# Patient Record
Sex: FEMALE | Race: WHITE | NOT HISPANIC OR LATINO
[De-identification: names, ages, dates, MRNs, and addresses within clinical notes are randomized per-mention and may not be internally consistent; named-entity substitution may affect disease eponyms.]

---

## 2018-10-29 ENCOUNTER — RESULT REVIEW (OUTPATIENT)
Age: 73
End: 2018-10-29

## 2022-05-12 ENCOUNTER — APPOINTMENT (OUTPATIENT)
Dept: ORTHOPEDIC SURGERY | Facility: CLINIC | Age: 77
End: 2022-05-12
Payer: MEDICARE

## 2022-05-12 VITALS — HEIGHT: 60 IN | WEIGHT: 145 LBS | BODY MASS INDEX: 28.47 KG/M2

## 2022-05-12 DIAGNOSIS — Z78.9 OTHER SPECIFIED HEALTH STATUS: ICD-10-CM

## 2022-05-12 PROCEDURE — 20610 DRAIN/INJ JOINT/BURSA W/O US: CPT

## 2022-05-12 PROCEDURE — 99213 OFFICE O/P EST LOW 20 MIN: CPT | Mod: 25

## 2022-05-12 RX ORDER — METHYLPHENIDATE HYDROCHLORIDE 5 MG/1
TABLET ORAL
Refills: 0 | Status: ACTIVE | COMMUNITY

## 2022-05-12 RX ORDER — HYDROXYZINE HYDROCHLORIDE 50 MG/1
TABLET ORAL
Refills: 0 | Status: ACTIVE | COMMUNITY

## 2022-05-12 RX ORDER — DOXEPIN 6 MG/1
TABLET, FILM COATED ORAL
Refills: 0 | Status: ACTIVE | COMMUNITY

## 2022-05-12 RX ORDER — ATORVASTATIN CALCIUM 80 MG/1
TABLET, FILM COATED ORAL
Refills: 0 | Status: ACTIVE | COMMUNITY

## 2022-05-12 RX ORDER — VILAZODONE HYDROCHLORIDE 40 MG/1
TABLET ORAL
Refills: 0 | Status: ACTIVE | COMMUNITY

## 2022-05-12 RX ORDER — PROPRANOLOL HYDROCHLORIDE 80 MG/1
TABLET ORAL
Refills: 0 | Status: ACTIVE | COMMUNITY

## 2022-05-12 RX ORDER — MIRTAZAPINE 7.5 MG/1
TABLET, FILM COATED ORAL
Refills: 0 | Status: ACTIVE | COMMUNITY

## 2022-05-12 RX ORDER — CLONAZEPAM 2 MG/1
TABLET ORAL
Refills: 0 | Status: ACTIVE | COMMUNITY

## 2022-05-12 RX ORDER — ZOLPIDEM TARTRATE 5 MG/1
TABLET ORAL
Refills: 0 | Status: ACTIVE | COMMUNITY

## 2022-05-12 NOTE — HISTORY OF PRESENT ILLNESS
[8] : 8 [4] : 4 [FreeTextEntry5] : PT IS HERE FOR A FOLLOW UP ON THE R KNEE. cortisone inj helped for a few days, now knee pain has worsened

## 2022-05-12 NOTE — PROCEDURE
[FreeTextEntry3] : Large joint injection  was performed of the RIGHT  knee. \par The indication for this procedure was pain, inflammation and x-ray evidence of Osteoarthritis on this or prior visit. The site was prepped with alcohol, betadine, ethyl chloride sprayed topically and sterile technique used. \par An injection of ORTHOVISC  series; # 1  was used.  \par Patient was advised to call if redness, pain or fever occur, apply ice for 15 minutes out of every hour for the next 12-24 hours as tolerated and patient was advised to rest the joint(s) for 2 days. Patient has tried OTC's including aspirin, Ibuprofen, Aleve, etc or prescription NSAIDS, and/or exercises at home and/or physical therapy without satisfactory response, patient had decreased mobility in the joint and the risks benefits, and alternatives have been discussed, and verbal consent was obtained. \par  \par

## 2022-05-12 NOTE — IMAGING
[de-identified] : Right knee Varus deformity\par Mild effusion, no warmth, no ecchymosis\par Medial joint line tenderness to palpation \par Range of motion 5-110 with associated crepitus\par 5/5 quadriceps and hamstring strength\par Ligamentously stable\par Motor and sensory intact distally\par Mildly antalgic gait\par Negative Eliel\par

## 2022-05-19 ENCOUNTER — APPOINTMENT (OUTPATIENT)
Dept: ORTHOPEDIC SURGERY | Facility: CLINIC | Age: 77
End: 2022-05-19
Payer: MEDICARE

## 2022-05-19 VITALS — WEIGHT: 145 LBS | BODY MASS INDEX: 28.47 KG/M2 | HEIGHT: 60 IN

## 2022-05-19 PROCEDURE — 99024 POSTOP FOLLOW-UP VISIT: CPT

## 2022-05-19 PROCEDURE — 20610 DRAIN/INJ JOINT/BURSA W/O US: CPT

## 2022-05-19 NOTE — PROCEDURE
[FreeTextEntry3] : Large joint injection  was performed of the RIGHT knee. \par The indication for this procedure was pain, inflammation and x-ray evidence of Osteoarthritis on this or prior visit. The site was prepped with alcohol, betadine, ethyl chloride sprayed topically and sterile technique used. \par An injection of ORTHOVISC  series; #2 was used.  \par Patient was advised to call if redness, pain or fever occur, apply ice for 15 minutes out of every hour for the next 12-24 hours as tolerated and patient was advised to rest the joint(s) for 2 days. Patient has tried OTC's including aspirin, Ibuprofen, Aleve, etc or prescription NSAIDS, and/or exercises at home and/or physical therapy without satisfactory response, patient had decreased mobility in the joint and the risks benefits, and alternatives have been discussed, and verbal consent was obtained. \par 17CC CLEAR SF ASPIRATED PRIOR\par

## 2022-05-24 ENCOUNTER — TRANSCRIPTION ENCOUNTER (OUTPATIENT)
Age: 77
End: 2022-05-24

## 2022-05-25 ENCOUNTER — APPOINTMENT (OUTPATIENT)
Dept: ORTHOPEDIC SURGERY | Facility: CLINIC | Age: 77
End: 2022-05-25

## 2022-05-26 ENCOUNTER — APPOINTMENT (OUTPATIENT)
Dept: ORTHOPEDIC SURGERY | Facility: CLINIC | Age: 77
End: 2022-05-26

## 2022-05-27 ENCOUNTER — APPOINTMENT (OUTPATIENT)
Dept: ORTHOPEDIC SURGERY | Facility: CLINIC | Age: 77
End: 2022-05-27

## 2022-05-27 VITALS — HEIGHT: 60 IN | BODY MASS INDEX: 28.47 KG/M2 | WEIGHT: 145 LBS

## 2022-05-27 PROCEDURE — 20610 DRAIN/INJ JOINT/BURSA W/O US: CPT

## 2022-05-31 NOTE — HISTORY OF PRESENT ILLNESS
[3] : 3 [Orthovisc] : Orthovisc [de-identified] : ORTHOVISC #3 RIGHT KNEE [] : no [de-identified] : 05/19/2022 [de-identified] : right knee

## 2022-05-31 NOTE — PROCEDURE
[de-identified] : Procedure Name: Orthovisc (Large Joint)\par \par Viscosupplementation Injection: X-ray evidence of Osteoarthritis on this or prior visit, Patient has tried OTC's including aspirin, Ibuprofen, Aleve etc or prescription NSAIDS, and/or exercises at home and/ or physical therapy without satisfactory response and Repeat series performed because patient had significant improvement in their pain and functional capacity from prior series which was given more than six months ago. \par \par An injection of Orthovisc 2ml #3 was injected into the right knee(s). The risks, benefits, and alternatives to Viscosupplementation injection were explained in full to the patient. Risks outlined include but are not limited to infection, sepsis, bleeding, scarring, skin discoloration, temporary increase in pain, syncopal episode, failure to resolve symptoms, allergic reaction, and symptom recurrence. Signs and symptoms of infection reviewed and patient advised to call immediately for redness, fevers, and/or chills. Patient understood the risks. All questions were answered. After discussion of options, patient requested Viscosupplementation. Oral informed consent was obtained and sterile prep was done of the injection site. The patient tolerated the procedure well. Ice tonight to the injection site. \par

## 2022-06-02 ENCOUNTER — APPOINTMENT (OUTPATIENT)
Dept: ORTHOPEDIC SURGERY | Facility: CLINIC | Age: 77
End: 2022-06-02

## 2022-06-08 ENCOUNTER — APPOINTMENT (OUTPATIENT)
Dept: ORTHOPEDIC SURGERY | Facility: CLINIC | Age: 77
End: 2022-06-08
Payer: MEDICARE

## 2022-06-08 VITALS — HEIGHT: 60 IN | WEIGHT: 145 LBS | BODY MASS INDEX: 28.47 KG/M2

## 2022-06-08 DIAGNOSIS — M17.11 UNILATERAL PRIMARY OSTEOARTHRITIS, RIGHT KNEE: ICD-10-CM

## 2022-06-08 PROCEDURE — 99024 POSTOP FOLLOW-UP VISIT: CPT | Mod: NC

## 2022-06-08 PROCEDURE — 20610 DRAIN/INJ JOINT/BURSA W/O US: CPT

## 2022-06-08 NOTE — ASSESSMENT
[FreeTextEntry1] : ORTHOVISC #4 RIGHT KNEE, TOLERATED WELL\par DISCUSSED TIMING OF INJECTIONS\par RTO PRN

## 2022-06-08 NOTE — HISTORY OF PRESENT ILLNESS
[Orthovisc] : Orthovisc [3] : 3 [de-identified] : ORTHOVISC #4 RIGHT KNEE [] : no [FreeTextEntry1] : right knee [de-identified] : 05/19/2022 [de-identified] : right knee [TWNoteComboBox1] : 10%

## 2022-06-08 NOTE — PROCEDURE
[FreeTextEntry3] : Large joint injection was performed of the RIGHT knee. The indication for this procedure was pain and inflammation. The site was prepped with alcohol, betadine, ethyl chloride sprayed topically and sterile technique used. An injection of ORTHOVISC series #4 was used.\par \par Patient tolerated procedure well. Patient was advised to call if redness, pain of fever occur, apply ice for 15 minutes out of every hours for the next 12-24 hours as tolerated and patient was advised to rest the joint(s) for 2 days.\par \par Patient has tried OTC's including aspirin, ibuprofen, Aleve, etc or prescription NSAIDs, and/or exercises at home and/or physical therapy without satisfactory response, patient had decreased mobility in the joint and the risks, benefits and alternatives have been discussed, and verbal consent was obtained.\par \par

## 2022-06-24 ENCOUNTER — APPOINTMENT (OUTPATIENT)
Dept: ORTHOPEDIC SURGERY | Facility: CLINIC | Age: 77
End: 2022-06-24
Payer: MEDICARE

## 2022-06-24 DIAGNOSIS — Z00.00 ENCOUNTER FOR GENERAL ADULT MEDICAL EXAMINATION W/OUT ABNORMAL FINDINGS: ICD-10-CM

## 2022-06-24 PROCEDURE — 73630 X-RAY EXAM OF FOOT: CPT | Mod: 50

## 2022-06-24 PROCEDURE — 99214 OFFICE O/P EST MOD 30 MIN: CPT

## 2022-06-24 NOTE — IMAGING
[Bilateral] : foot bilaterally [Weight -] : weightbearing [Degenerative change] : Degenerative change [de-identified] : B/L MTPJ degenerative changes with dorsal osteophyte. midfoot degenerative changes

## 2022-06-24 NOTE — HISTORY OF PRESENT ILLNESS
[Gradual] : gradual [6] : 6 [4] : 4 [Dull/Aching] : dull/aching [Localized] : localized [Constant] : constant [Leisure] : leisure [Social interactions] : social interactions [Rest] : rest [Walking] : walking [de-identified] : 6/24/22: Pt is a 76 year old F who presents today for evaluation of their B/L feet. Pain and swelling to B/L big toe area. Had been seeing podiatry ([place]) but was unhappy w/ treatment and hadn't been in 2 yrs. Used to get injections. Denies trauma/previous injury. No N/T.  WB in sandals. Tried shoe accomodations, unable to wear dress shoes.  The left side is worse.  [] : Post Surgical Visit: no [FreeTextEntry1] : B/L feet

## 2022-06-24 NOTE — PHYSICAL EXAM
[Left] : left foot and ankle [Moderate] : moderate swelling of MTP joint/great toe [1st] : 1st [NL (20)] : dorsiflexion 20 degrees [NL (40)] : plantar flexion 40 degrees [5___] : plantar flexion 5[unfilled]/5 [2+] : dorsalis pedis pulse: 2+ [] : no calf tenderness

## 2022-06-24 NOTE — DISCUSSION/SUMMARY
[Surgical risks reviewed] : Surgical risks reviewed [de-identified] : Discussed cheilectomy/phal osteotomy\par \par Pt travelling [place], consider injection

## 2022-08-16 ENCOUNTER — APPOINTMENT (OUTPATIENT)
Dept: ORTHOPEDIC SURGERY | Facility: CLINIC | Age: 77
End: 2022-08-16

## 2022-08-16 DIAGNOSIS — M20.22 HALLUX RIGIDUS, LEFT FOOT: ICD-10-CM

## 2022-08-16 PROCEDURE — 20604 DRAIN/INJ JOINT/BURSA W/US: CPT | Mod: LT

## 2022-08-16 PROCEDURE — 99213 OFFICE O/P EST LOW 20 MIN: CPT | Mod: 25

## 2022-08-16 PROCEDURE — J3490M: CUSTOM

## 2022-08-16 NOTE — IMAGING
[Bilateral] : foot bilaterally [Weight -] : weightbearing [Degenerative change] : Degenerative change [de-identified] : B/L MTPJ degenerative changes with dorsal osteophyte. midfoot degenerative changes

## 2022-08-16 NOTE — HISTORY OF PRESENT ILLNESS
[Gradual] : gradual [6] : 6 [4] : 4 [Dull/Aching] : dull/aching [Localized] : localized [Constant] : constant [Leisure] : leisure [Social interactions] : social interactions [Rest] : rest [Walking] : walking [de-identified] : 6/24/22: Pt is a 76 year old F who presents today for evaluation of their B/L feet. Pain and swelling to B/L big toe area. Had been seeing podiatry ([place]) but was unhappy w/ treatment and hadn't been in 2 yrs. Used to get injections. Denies trauma/previous injury. No N/T.  WB in sandals. Tried shoe accommodations, unable to wear dress shoes.  The left side is worse. \par \par 8/16/22: F/U B/L Feet; requesting injection to the left side.  [] : Post Surgical Visit: no [FreeTextEntry1] : B/L feet

## 2022-08-16 NOTE — DISCUSSION/SUMMARY
[Surgical risks reviewed] : Surgical risks reviewed [de-identified] : Discussed cheilectomy/phal osteotomy\par \par Pt travelling [place], consider injection

## 2022-08-16 NOTE — PROCEDURE
[Small Joint Injection] : small joint injection [Left] : of the left [1st] : 1st [Metatarsophalangeal joint] : metatarsophalangeal joint [Pain] : pain [Inflammation] : inflammation [X-ray evidence of Osteoarthritis on this or prior visit] : x-ray evidence of Osteoarthritis on this or prior visit [Alcohol] : alcohol [Ethyl Chloride sprayed topically] : ethyl chloride sprayed topically [Sterile technique used] : sterile technique used [___ cc    6mg] :  Betamethasone ([facility]) ~Vcc of 6mg [___ cc    1%] : Lidocaine ~Vcc of 1%  [___ cc    0.25%] : Bupivacaine ([facility]) ~Vcc of 0.25%  [] : Patient tolerated procedure well [Call if redness, pain or fever occur] : call if redness, pain or fever occur [Apply ice for 15min out of every hour for the next 12-24 hours as tolerated] : apply ice for 15 minutes out of every hour for the next 12-24 hours as tolerated [Patient was advised to rest the joint(s) for ____ days] : patient was advised to rest the joint(s) for [unfilled] days [Previous OTC use and PT nontherapeutic] : patient has tried OTC's including aspirin, Ibuprofen, Aleve, etc or prescription NSAIDS, and/or exercises at home and/or physical therapy without satisfactory response [Patient had decreased mobility in the joint] : patient had decreased mobility in the joint [Risks, benefits, alternatives discussed / Verbal consent obtained] : the risks benefits, and alternatives have been discussed, and verbal consent was obtained

## 2022-09-27 ENCOUNTER — APPOINTMENT (OUTPATIENT)
Dept: ORTHOPEDIC SURGERY | Facility: CLINIC | Age: 77
End: 2022-09-27

## 2023-08-01 ENCOUNTER — APPOINTMENT (OUTPATIENT)
Dept: ORTHOPEDIC SURGERY | Facility: CLINIC | Age: 78
End: 2023-08-01
Payer: MEDICARE

## 2023-08-01 DIAGNOSIS — M84.30XA STRESS FRACTURE, UNSPECIFIED SITE, INITIAL ENCOUNTER FOR FRACTURE: ICD-10-CM

## 2023-08-01 PROCEDURE — 73630 X-RAY EXAM OF FOOT: CPT | Mod: RT

## 2023-08-01 PROCEDURE — 99213 OFFICE O/P EST LOW 20 MIN: CPT

## 2023-08-01 NOTE — HISTORY OF PRESENT ILLNESS
[de-identified] : 6/24/22: Pt is a 76 year old F who presents today for evaluation of their B/L feet. Pain and swelling to B/L big toe area. Had been seeing podiatry ([place]) but was unhappy w/ treatment and hadn't been in 2 yrs. Used to get injections. Denies trauma/previous injury. No N/T.  WB in sandals. Tried shoe accommodations, unable to wear dress shoes.  The left side is worse.   8/16/22: F/U B/L Feet; requesting injection to the left side.   8/1/23: Right lateral foot pain since 7/18/23. She denies injury. she reports the pain has been improving, but there is still some pain with weightbearing. She has a hx osteopenia. WBAT in sandals.

## 2023-08-01 NOTE — DISCUSSION/SUMMARY
[de-identified] : Would like to review her bone density. MRI R foot r/o stress fracture 5th metatarsal fu after imaging for review

## 2023-08-01 NOTE — PHYSICAL EXAM
[Right] : right foot and ankle [Mild] : mild swelling of lateral foot [5th] : 5th [NL (20)] : dorsiflexion 20 degrees [NL (40)] : plantar flexion 40 degrees [2+] : dorsalis pedis pulse: 2+ [] : patient ambulates without assistive device

## 2023-08-03 ENCOUNTER — APPOINTMENT (OUTPATIENT)
Dept: MRI IMAGING | Facility: CLINIC | Age: 78
End: 2023-08-03
Payer: MEDICARE

## 2023-08-03 PROCEDURE — 73718 MRI LOWER EXTREMITY W/O DYE: CPT | Mod: RT,MH

## 2023-08-15 ENCOUNTER — APPOINTMENT (OUTPATIENT)
Dept: ORTHOPEDIC SURGERY | Facility: CLINIC | Age: 78
End: 2023-08-15
Payer: MEDICARE

## 2023-08-15 DIAGNOSIS — M20.21 HALLUX RIGIDUS, RIGHT FOOT: ICD-10-CM

## 2023-08-15 DIAGNOSIS — M19.071 PRIMARY OSTEOARTHRITIS, RIGHT ANKLE AND FOOT: ICD-10-CM

## 2023-08-15 PROCEDURE — 99214 OFFICE O/P EST MOD 30 MIN: CPT

## 2023-08-15 NOTE — HISTORY OF PRESENT ILLNESS
[0] : 0 [de-identified] : 6/24/22: Pt is a 76 year old F who presents today for evaluation of their B/L feet. Pain and swelling to B/L big toe area. Had been seeing podiatry ([place]) but was unhappy w/ treatment and hadn't been in 2 yrs. Used to get injections. Denies trauma/previous injury. No N/T.  WB in sandals. Tried shoe accommodations, unable to wear dress shoes.  The left side is worse.   8/16/22: F/U B/L Feet; requesting injection to the left side.   8/1/23: Right lateral foot pain since 7/18/23. She denies injury. she reports the pain has been improving, but there is still some pain with weightbearing. She has a hx osteopenia. WBAT in sandals.  08/15/23: f/u b/l foot. Here to review MRI report for right foot.  Feels better

## 2023-08-15 NOTE — PHYSICAL EXAM
[Right] : right foot and ankle [Mild] : mild swelling of lateral foot [5th] : 5th [NL (20)] : dorsiflexion 20 degrees [NL (40)] : plantar flexion 40 degrees [2+] : dorsalis pedis pulse: 2+ [] : no achilles tendon insertion tenderness

## 2023-08-15 NOTE — DISCUSSION/SUMMARY
[de-identified] : Would like to review her bone density. Accomodateive shoes/low impact activities

## 2023-08-15 NOTE — DATA REVIEWED
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 Nonacute nondepressed subchondral fracture of the distal intermediate, lateral cuneiform and cuboid as well as the base of the 2nd, 3rd and 5th metatarsals. Severe arthrosis of the 1st mtp joint and sesamoid joints.